# Patient Record
(demographics unavailable — no encounter records)

---

## 2025-01-09 NOTE — HISTORY OF PRESENT ILLNESS
[de-identified] : 5mo M  Birth History:  full term, uncomplicated pregnancy, birth and nursery course, No NICU stay  Kendamil feeding, then transitioned to Enfamil for short time due to shortage then back to Kendamil Feeding well and gaining weight well  Initially yellow seedy stools, pasty 3-4x daily   One month ago - acute symptom onset  (4 months) Now stooling 6-7x daily, more liquid and small amounts of blood and mucous in stool  Changed formula to Nutramigen, baby refused to take the milk and had some facial rash  Also on Alimentum Continued on Kendamil 2-4oz every 2 hours, and then 4-5oz x2 feeds overnight  Happily takes the bottle Minimal spit ups   Probiotics daily

## 2025-01-09 NOTE — HISTORY OF PRESENT ILLNESS
[de-identified] : 5mo M  Birth History:  full term, uncomplicated pregnancy, birth and nursery course, No NICU stay  Kendamil feeding, then transitioned to Enfamil for short time due to shortage then back to Kendamil Feeding well and gaining weight well  Initially yellow seedy stools, pasty 3-4x daily   One month ago - acute symptom onset  (4 months) Now stooling 6-7x daily, more liquid and small amounts of blood and mucous in stool  Changed formula to Nutramigen, baby refused to take the milk and had some facial rash  Also on Alimentum Continued on Kendamil 2-4oz every 2 hours, and then 4-5oz x2 feeds overnight  Happily takes the bottle Minimal spit ups   Probiotics daily